# Patient Record
Sex: MALE | Race: BLACK OR AFRICAN AMERICAN | NOT HISPANIC OR LATINO | Employment: FULL TIME | URBAN - METROPOLITAN AREA
[De-identification: names, ages, dates, MRNs, and addresses within clinical notes are randomized per-mention and may not be internally consistent; named-entity substitution may affect disease eponyms.]

---

## 2023-10-15 ENCOUNTER — HOSPITAL ENCOUNTER (EMERGENCY)
Facility: HOSPITAL | Age: 43
Discharge: HOME | End: 2023-10-15
Attending: EMERGENCY MEDICINE
Payer: COMMERCIAL

## 2023-10-15 VITALS
RESPIRATION RATE: 13 BRPM | TEMPERATURE: 99 F | HEART RATE: 70 BPM | SYSTOLIC BLOOD PRESSURE: 120 MMHG | OXYGEN SATURATION: 99 % | DIASTOLIC BLOOD PRESSURE: 70 MMHG | WEIGHT: 147 LBS

## 2023-10-15 DIAGNOSIS — E87.6 HYPOKALEMIA: ICD-10-CM

## 2023-10-15 DIAGNOSIS — R09.02 HYPOXIA: ICD-10-CM

## 2023-10-15 DIAGNOSIS — T50.901A OVERDOSE: ICD-10-CM

## 2023-10-15 DIAGNOSIS — T50.901A ACCIDENTAL DRUG OVERDOSE, INITIAL ENCOUNTER: Primary | ICD-10-CM

## 2023-10-15 LAB
ALBUMIN SERPL-MCNC: 3.9 G/DL (ref 3.3–5.5)
ALLENS TEST: ABNORMAL
ALP SERPL-CCNC: 74 U/L (ref 42–141)
AMPHET+METHAMPHET UR QL: NEGATIVE
BARBITURATES UR QL SCN>200 NG/ML: NEGATIVE
BENZODIAZ UR QL SCN>200 NG/ML: NEGATIVE
BILIRUB SERPL-MCNC: 0.7 MG/DL (ref 0.2–1.6)
BILIRUBIN, POC UA: NEGATIVE
BLOOD, POC UA: NEGATIVE
BUN SERPL-MCNC: 13 MG/DL (ref 7–22)
BZE UR QL SCN: NEGATIVE
CALCIUM SERPL-MCNC: 9.5 MG/DL (ref 8–10.3)
CANNABINOIDS UR QL SCN: ABNORMAL
CHLORIDE SERPL-SCNC: 108 MMOL/L (ref 98–108)
CLARITY, POC UA: CLEAR
COLOR, POC UA: YELLOW
CREAT SERPL-MCNC: 0.9 MG/DL (ref 0.6–1.2)
CREAT UR-MCNC: 25.3 MG/DL (ref 23–375)
DELSYS: ABNORMAL
GLUCOSE SERPL-MCNC: 135 MG/DL (ref 73–118)
GLUCOSE, POC UA: NEGATIVE
HCO3 UR-SCNC: 17.9 MMOL/L (ref 24–28)
KETONES, POC UA: NEGATIVE
LDH SERPL L TO P-CCNC: 3.7 MMOL/L (ref 0.36–1.25)
LEUKOCYTE EST, POC UA: NEGATIVE
METHADONE UR QL SCN>300 NG/ML: NEGATIVE
NITRITE, POC UA: NEGATIVE
OPIATES UR QL SCN: NEGATIVE
PCO2 BLDA: 35.3 MMHG (ref 35–45)
PCP UR QL SCN>25 NG/ML: NEGATIVE
PH SMN: 7.31 [PH] (ref 7.35–7.45)
PH UR STRIP: 5.5 [PH]
PO2 BLDA: 140 MMHG (ref 80–100)
POC ALT (SGPT): 29 U/L (ref 10–47)
POC AST (SGOT): 33 U/L (ref 11–38)
POC B-TYPE NATRIURETIC PEPTIDE: 8.9 PG/ML (ref 0–100)
POC BE: -8 MMOL/L
POC CARDIAC TROPONIN I: 0 NG/ML (ref 0–0.08)
POC PTINR: 1.2 (ref 0.9–1.2)
POC PTWBT: 14.1 SEC (ref 9.7–14.3)
POC SATURATED O2: 99 % (ref 95–100)
POC TCO2: 19 MMOL/L (ref 18–33)
POC TCO2: 19 MMOL/L (ref 23–27)
POTASSIUM BLD-SCNC: 3.2 MMOL/L (ref 3.6–5.1)
PROTEIN, POC UA: NEGATIVE
PROTEIN, POC: 7.5 G/DL (ref 6.4–8.1)
SAMPLE: ABNORMAL
SAMPLE: NORMAL
SAMPLE: NORMAL
SITE: ABNORMAL
SODIUM BLD-SCNC: 141 MMOL/L (ref 128–145)
SPECIFIC GRAVITY, POC UA: <=1.005
TOXICOLOGY INFORMATION: ABNORMAL
UROBILINOGEN, POC UA: 0.2 E.U./DL

## 2023-10-15 PROCEDURE — 93005 ELECTROCARDIOGRAM TRACING: CPT | Mod: ER

## 2023-10-15 PROCEDURE — 63600175 PHARM REV CODE 636 W HCPCS: Mod: ER

## 2023-10-15 PROCEDURE — 93010 EKG 12-LEAD: ICD-10-PCS | Mod: ,,, | Performed by: INTERNAL MEDICINE

## 2023-10-15 PROCEDURE — 99285 EMERGENCY DEPT VISIT HI MDM: CPT | Mod: 25,ER

## 2023-10-15 PROCEDURE — 96360 HYDRATION IV INFUSION INIT: CPT | Mod: ER

## 2023-10-15 PROCEDURE — 80053 COMPREHEN METABOLIC PANEL: CPT | Mod: ER

## 2023-10-15 PROCEDURE — 80307 DRUG TEST PRSMV CHEM ANLYZR: CPT | Performed by: EMERGENCY MEDICINE

## 2023-10-15 PROCEDURE — 84484 ASSAY OF TROPONIN QUANT: CPT | Mod: ER

## 2023-10-15 PROCEDURE — 85025 COMPLETE CBC W/AUTO DIFF WBC: CPT | Mod: ER

## 2023-10-15 PROCEDURE — 25000003 PHARM REV CODE 250: Mod: ER | Performed by: EMERGENCY MEDICINE

## 2023-10-15 PROCEDURE — 93010 ELECTROCARDIOGRAM REPORT: CPT | Mod: ,,, | Performed by: INTERNAL MEDICINE

## 2023-10-15 PROCEDURE — 63600175 PHARM REV CODE 636 W HCPCS: Mod: ER | Performed by: EMERGENCY MEDICINE

## 2023-10-15 PROCEDURE — 82803 BLOOD GASES ANY COMBINATION: CPT | Mod: ER

## 2023-10-15 PROCEDURE — 96361 HYDRATE IV INFUSION ADD-ON: CPT | Mod: ER

## 2023-10-15 RX ORDER — NALOXONE HCL 0.4 MG/ML
1 VIAL (ML) INJECTION
Status: COMPLETED | OUTPATIENT
Start: 2023-10-15 | End: 2023-10-15

## 2023-10-15 RX ORDER — ONDANSETRON 8 MG/1
8 TABLET, ORALLY DISINTEGRATING ORAL EVERY 6 HOURS PRN
Qty: 12 TABLET | Refills: 0 | Status: SHIPPED | OUTPATIENT
Start: 2023-10-15 | End: 2023-10-18

## 2023-10-15 RX ORDER — NALOXONE HCL 0.4 MG/ML
VIAL (ML) INJECTION
Status: COMPLETED
Start: 2023-10-15 | End: 2023-10-15

## 2023-10-15 RX ORDER — ACETAMINOPHEN 500 MG
500 TABLET ORAL EVERY 6 HOURS PRN
Qty: 30 TABLET | Refills: 0 | Status: SHIPPED | OUTPATIENT
Start: 2023-10-15

## 2023-10-15 RX ORDER — FAMOTIDINE 20 MG/1
20 TABLET, FILM COATED ORAL 2 TIMES DAILY
Qty: 20 TABLET | Refills: 0 | Status: SHIPPED | OUTPATIENT
Start: 2023-10-15 | End: 2024-10-14

## 2023-10-15 RX ORDER — NALOXONE HYDROCHLORIDE 4 MG/.1ML
SPRAY NASAL
Qty: 1 EACH | Refills: 11 | Status: SHIPPED | OUTPATIENT
Start: 2023-10-15

## 2023-10-15 RX ADMIN — SODIUM CHLORIDE 1000 ML: 9 INJECTION, SOLUTION INTRAVENOUS at 11:10

## 2023-10-15 RX ADMIN — NALXONE HYDROCHLORIDE 0.4 MG: 0.4 INJECTION INTRAMUSCULAR; INTRAVENOUS; SUBCUTANEOUS at 10:10

## 2023-10-15 RX ADMIN — NALXONE HYDROCHLORIDE 1 MG: 0.4 INJECTION INTRAMUSCULAR; INTRAVENOUS; SUBCUTANEOUS at 09:10

## 2023-10-15 RX ADMIN — SODIUM CHLORIDE 1000 ML: 9 INJECTION, SOLUTION INTRAVENOUS at 10:10

## 2023-10-15 RX ADMIN — POTASSIUM BICARBONATE 25 MEQ: 977.5 TABLET, EFFERVESCENT ORAL at 11:10

## 2023-10-15 NOTE — ED TRIAGE NOTES
"Antoni Guzman, a 43 y.o. male presents to the ED w/ complaint of SOB after taking percs and ecstasy pills. Pt unable to answer questions and not responding to hurtado rub Charge and MD alerted IN Narcan given       Triage note:  Chief Complaint   Patient presents with    Drug Overdose     Reports taking "pain pill" 1hour ago with alcohol and reports "feeling Im going to die".  Lethargic at triage     Review of patient's allergies indicates:  No Known Allergies  No past medical history on file.   "

## 2023-10-15 NOTE — Clinical Note
"Antoni"Carito Guzman was seen and treated in our emergency department on 10/15/2023.  He may return to work on 10/17/2023.       If you have any questions or concerns, please don't hesitate to call.      Francine New, DO"

## 2023-10-15 NOTE — ED PROVIDER NOTES
"Encounter Date: 10/15/2023    SCRIBE #1 NOTE: I, Laverne Powell, am scribing for, and in the presence of,  Francine New DO. I have scribed the following portions of the note - Other sections scribed: HPI, ROS, PE.   SCRIBE #2 NOTE: I, Anamaria Damian, am scribing for, and in the presence of,  Francine New DO. I have scribed the following portions of the note - Other sections scribed: MDM.     History     Chief Complaint   Patient presents with    Drug Overdose     Reports taking "pain pill" 1hour ago with alcohol and reports "feeling Im going to die".  Lethargic at triage     Antoni Guzman is a 43 y.o. male who presents to the ED for chief complaint of SOB that began around 12 AM this morning. Patient states "feeling like I'm going to die". Pt reports taking one of his family member's pain pills for tooth pain. Patient reports he does not normally take pain medication. As per the independent historian, wife, patient also took ecstasy and drank 1-2 beers last night. Wife states patient crushes up ecstasy pills and snorts them. Patient woke up wife in this morning who rushed him to the hospital. Patient has no known allergies. Information provided by the Patient is limited due to AMS.       The history is provided by the patient and the spouse. No  was used.     Review of patient's allergies indicates:  No Known Allergies  No past medical history on file.  No past surgical history on file.  No family history on file.     Review of Systems   Unable to perform ROS: Mental status change   Constitutional:  Negative for fever.   HENT:  Negative for rhinorrhea and sore throat.    Respiratory:  Positive for shortness of breath.    Cardiovascular:  Negative for leg swelling.   Skin:  Negative for rash.   Neurological:  Negative for numbness.   All other systems reviewed and are negative.      Physical Exam     Initial Vitals   BP Pulse Resp Temp SpO2   10/15/23 0936 10/15/23 0936 10/15/23 0936 10/15/23 " 1021 10/15/23 0936   (!) 94/57 61 12 99 °F (37.2 °C) (!) 94 %      MAP       --                Physical Exam    Nursing note and vitals reviewed.  Constitutional: He appears well-developed and well-nourished. He appears toxic.   Patient's wife gave consent to have physical exam performed.  Woke to sternal rub initially, after IN Narcan woke to voice, and after IV Narcan pt is agitated.    HENT:   Head: Normocephalic and atraumatic.   Right Ear: External ear normal.   Left Ear: External ear normal.   Mouth/Throat: Oropharynx is clear and moist.   Eyes: Conjunctivae and EOM are normal. Pupils are equal, round, and reactive to light.   Neck: Neck supple.   Normal range of motion.  Cardiovascular:  Regular rhythm, normal heart sounds and intact distal pulses.   Tachycardia present.   Exam reveals no gallop and no friction rub.       No murmur heard.  Pulmonary/Chest: Breath sounds normal. No respiratory distress. He has no wheezes. He has no rhonchi. He has no rales.   Diminished breath sounds   Abdominal: Abdomen is soft. Bowel sounds are normal. He exhibits no distension. There is no abdominal tenderness. There is no rebound and no guarding.   Musculoskeletal:         General: No tenderness or edema. Normal range of motion.      Cervical back: Normal range of motion and neck supple.      Comments: Moving all four extremities, no obvious deformities, no bony tenderness to shoulder, elbows, wrists, hips or ankles.      Neurological: He is alert and oriented to person, place, and time.   Skin: Skin is warm and dry.   Psychiatric: He has a normal mood and affect. His behavior is normal.         ED Course   Critical Care    Date/Time: 10/15/2023 10:57 AM    Performed by: Francine New DO  Authorized by: Francine New DO  Direct patient critical care time: 15 minutes  Additional history critical care time: 15 minutes  Ordering / reviewing critical care time: 15 minutes  Documentation critical care time: 15  minutes  Consulting other physicians critical care time: 15 minutes  Consult with family critical care time: 15 minutes  Total critical care time (exclusive of procedural time) : 90 minutes  Critical care was necessary to treat or prevent imminent or life-threatening deterioration of the following conditions: respiratory failure and circulatory failure.  Critical care was time spent personally by me on the following activities: evaluation of patient's response to treatment, examination of patient, obtaining history from patient or surrogate, ordering and performing treatments and interventions, ordering and review of laboratory studies, ordering and review of radiographic studies, pulse oximetry, re-evaluation of patient's condition and review of old charts.        Labs Reviewed   DRUG SCREEN PANEL, URINE EMERGENCY - Abnormal; Notable for the following components:       Result Value    THC Presumptive Positive (*)     All other components within normal limits    Narrative:     Specimen Source->Urine   POCT URINALYSIS W/O SCOPE - Abnormal; Notable for the following components:    Spec Grav UA <=1.005 (*)     All other components within normal limits   ISTAT PROCEDURE - Abnormal; Notable for the following components:    POC PH 7.313 (*)     POC PO2 140 (*)     POC HCO3 17.9 (*)     POC Lactate 3.70 (*)     POC TCO2 19 (*)     All other components within normal limits   POCT CMP - Abnormal; Notable for the following components:    POC Glucose 135 (*)     POC Potassium 3.2 (*)     All other components within normal limits   TROPONIN ISTAT   POCT URINALYSIS W/O SCOPE   POCT CBC   POCT CMP   POCT PROTIME-INR   POCT TROPONIN   POCT B-TYPE NATRIURETIC PEPTIDE (BNP)   POCT B-TYPE NATRIURETIC PEPTIDE (BNP)   ISTAT PROCEDURE          Imaging Results              X-Ray Chest AP Portable (Final result)  Result time 10/15/23 10:53:51      Final result by Michael Juárez MD (10/15/23 10:53:51)                   Impression:     "  No acute chest disease identified.      Electronically signed by: Michael Juárez MD  Date:    10/15/2023  Time:    10:53               Narrative:    EXAMINATION:  XR CHEST AP PORTABLE    CLINICAL HISTORY:  Hypoxia;    TECHNIQUE:  Single frontal view of the chest was performed.    COMPARISON:  None    FINDINGS:  The heart and mediastinal structures are unremarkable.  Pulmonary vasculature is within normal limits.  The lungs are well aerated and free of focal consolidations.  There is no evidence for pneumothorax or large pleural effusions.  Bony structures are grossly intact.                                       Medications   naloxone (NARCAN) 0.4 mg/mL injection (0.4 mg  Given 10/15/23 1020)   sodium chloride 0.9% bolus 1,000 mL 1,000 mL (0 mLs Intravenous Stopped 10/15/23 1155)   naloxone 0.4 mg/mL injection 1 mg (0.4 mg Nasal Given 10/15/23 1020)   sodium chloride 0.9% bolus 1,000 mL 1,000 mL (0 mLs Intravenous Stopped 10/15/23 1401)   potassium bicarbonate disintegrating tablet 25 mEq (25 mEq Oral Given 10/15/23 1159)     Medical Decision Making  Amount and/or Complexity of Data Reviewed  Independent Historian: spouse     Details: Wife  Labs: ordered.  Radiology: ordered.    Risk  OTC drugs.  Prescription drug management.    Medical Decision Making:    This is an evaluation of a 43 y.o. male that presents to the Emergency Department for   Chief Complaint   Patient presents with    Drug Overdose     Reports taking "pain pill" 1hour ago with alcohol and reports "feeling Im going to die".  Lethargic at triage     Independent historian: Wife     The patient is a non-toxic and well appearing patient. On physical exam, patient appears well hydrated with moist mucus membranes. No murmurs. Abdomen soft and non tender. Patient is tolerating PO without difficulty.  Vital Signs Are Reassuring.     Based on the patient's symptoms, I am considering and evaluating for the following differential diagnoses: overdose, " accidental overdose, alcohol intoxication, hypoxia, respiratory failure.    Consider hospitalization for:  Overdose    Patient is not agreeable to transfer and admission to any hospital.    ED Course:Treatment in the ED included Physical Exam and medications given in ED  Medications   naloxone (NARCAN) 0.4 mg/mL injection (0.4 mg  Given 10/15/23 1020)   sodium chloride 0.9% bolus 1,000 mL 1,000 mL (0 mLs Intravenous Stopped 10/15/23 1155)   naloxone 0.4 mg/mL injection 1 mg (0.4 mg Nasal Given 10/15/23 1020)   sodium chloride 0.9% bolus 1,000 mL 1,000 mL (0 mLs Intravenous Stopped 10/15/23 1401)   potassium bicarbonate disintegrating tablet 25 mEq (25 mEq Oral Given 10/15/23 1159)   .   Patient reports feeling better after treatment in the ER.  Patient reports he is feeling pretty.  Signs been stable.  O2 sats present not on oxygen.  Heart rate within normal limits.  Blood pressure within normal limits.  Patient now tolerating p.o. without difficulty.    External Data/Documents Reviewed:   Labs: ordered and reviewed. Decision-making details documented in ED Course.  Radiology: ordered as indicated and reviewed. Decision-making details documented in ED Course.   ECG/medicine tests: ordered and independent interpretation performed by Dr. Francine New DO. Decision-making details documented in ED Course.     Risk  Diagnosis or treatment significantly limited by the following social determinants of health: There is no height or weight on file to calculate BMI.     In shared decision making with the patient and his wife at bedside, we discussed treatment, prescriptions, labs, and imaging results.    Discharge home with   ED Prescriptions       Medication Sig Dispense Start Date End Date Auth. Provider    naloxone (NARCAN) 4 mg/actuation Spry 4mg by nasal route as needed for opioid overdose; may repeat every 2-3 minutes in alternating nostrils until medical help arrives. Call 911 1 each 10/15/2023 -- Francine New DO     famotidine (PEPCID) 20 MG tablet Take 1 tablet (20 mg total) by mouth 2 (two) times daily. 20 tablet 10/15/2023 10/14/2024 Francine New DO    ondansetron (ZOFRAN-ODT) 8 MG TbDL Take 1 tablet (8 mg total) by mouth every 6 (six) hours as needed (As needed for nausea and vomiting). 12 tablet 10/15/2023 10/18/2023 Francine New DO    acetaminophen (TYLENOL) 500 MG tablet Take 1 tablet (500 mg total) by mouth every 6 (six) hours as needed for Pain (As needed for mild-to-moderate pain). 30 tablet 10/15/2023 -- Francine New DO          Fill and take prescriptions as directed.  Return to the ED if symptoms worsen or do not resolve.   Answered questions and discussed discharge plan.    Patient feels better and is ready for discharge.  Follow up with PCP/specialist in 1 day     At time of discharge patient is awake alert oriented x4 speaking clearly in full sentences and moving all 4 extremities.     The following labs and imaging were reviewed:        Admission on 10/15/2023, Discharged on 10/15/2023   Component Date Value Ref Range Status    POC PH 10/15/2023 7.313 (L)  7.35 - 7.45 Final    POC PCO2 10/15/2023 35.3  35 - 45 mmHg Final    POC PO2 10/15/2023 140 (H)  80 - 100 mmHg Final    POC HCO3 10/15/2023 17.9 (L)  24 - 28 mmol/L Final    POC BE 10/15/2023 -8  -2 to 2 mmol/L Final    POC SATURATED O2 10/15/2023 99  95 - 100 % Final    POC Lactate 10/15/2023 3.70 (HH)  0.36 - 1.25 mmol/L Final    POC TCO2 10/15/2023 19 (L)  23 - 27 mmol/L Final    Sample 10/15/2023 ARTERIAL   Final    Site 10/15/2023 LR   Final    Allens Test 10/15/2023 Pass   Final    DelSys 10/15/2023 Nasal Can   Final    Benzodiazepines 10/15/2023 Negative  Negative Final    Methadone metabolites 10/15/2023 Negative  Negative Final    Cocaine (Metab.) 10/15/2023 Negative  Negative Final    Opiate Scrn, Ur 10/15/2023 Negative  Negative Final    Barbiturate Screen, Ur 10/15/2023 Negative  Negative Final    Amphetamine Screen, Ur 10/15/2023 Negative   Negative Final    THC 10/15/2023 Presumptive Positive (A)  Negative Final    Phencyclidine 10/15/2023 Negative  Negative Final    Creatinine, Urine 10/15/2023 25.3  23.0 - 375.0 mg/dL Final    Toxicology Information 10/15/2023 SEE COMMENT   Final    Comment: This screen includes the following classes of drugs at the listed   cut-off:    Benzodiazepines 200 ng/ml  Methadone 300 ng/ml  Cocaine metabolite 300 ng/ml  Opiates 300 ng/ml  Barbiturates 200 ng/ml  Amphetamines 1000 ng/ml  Marijuana metabs (THC) 50 ng/ml  Phencyclidine (PCP) 25 ng/ml    This is a screening test. If results do not correlate with clinical   presentation, then a confirmatory send out test is advised.     This report is intended for use in clinical monitoring and management   of   patients. It is not intended for use in employment related drug   testing.      Albumin, POC 10/15/2023 3.9  3.3 - 5.5 g/dL Final    Alkaline Phosphatase, POC 10/15/2023 74  42 - 141 U/L Final    ALT (SGPT), POC 10/15/2023 29  10 - 47 U/L Final    AST (SGOT), POC 10/15/2023 33  11 - 38 U/L Final    POC BUN 10/15/2023 13  7 - 22 mg/dL Final    Calcium, POC 10/15/2023 9.5  8.0 - 10.3 mg/dL Final    POC Chloride 10/15/2023 108  98 - 108 mmol/L Final    POC Creatinine 10/15/2023 0.9  0.6 - 1.2 mg/dL Final    POC Glucose 10/15/2023 135 (H)  73 - 118 mg/dL Final    POC Potassium 10/15/2023 3.2 (L)  3.6 - 5.1 mmol/L Final    POC Sodium 10/15/2023 141  128 - 145 mmol/L Final    Bilirubin, POC 10/15/2023 0.7  0.2 - 1.6 mg/dL Final    POC TCO2 10/15/2023 19  18 - 33 mmol/L Final    Protein, POC 10/15/2023 7.5  6.4 - 8.1 g/dL Final    POC B-Type Natriuretic Peptide 10/15/2023 8.9  0.0 - 100.0 pg/mL Final    POC PTWBT 10/15/2023 14.1  9.7 - 14.3 sec Final    POC PTINR 10/15/2023 1.2  0.9 - 1.2 Final    Sample 10/15/2023 unknown   Final    POC Cardiac Troponin I 10/15/2023 0.00  0.00 - 0.08 ng/mL Final    Sample 10/15/2023 unknown   Final    Comment: A single negative troponin is  insufficient to rule out myocardial infarction.  The use of a serial sampling protocol is recommended practice. Correlate results with reference intervals established for methodology used. Point of care and core laboratory   troponin results are not interchangeable.      Glucose, UA 10/15/2023 Negative   Final    Bilirubin, UA 10/15/2023 Negative   Final    Ketones, UA 10/15/2023 Negative   Final    Spec Grav UA 10/15/2023 <=1.005 (<)   Final    Blood, UA 10/15/2023 Negative   Final    PH, UA 10/15/2023 5.5   Final    Protein, UA 10/15/2023 Negative   Final    Urobilinogen, UA 10/15/2023 0.2  E.U./dL Final    Nitrite, UA 10/15/2023 Negative   Final    Leukocytes, UA 10/15/2023 Negative   Final    Color, UA 10/15/2023 Yellow   Final    Clarity, UA 10/15/2023 Clear   Final        Imaging Results              X-Ray Chest AP Portable (Final result)  Result time 10/15/23 10:53:51      Final result by Michael Juárez MD (10/15/23 10:53:51)                   Impression:      No acute chest disease identified.      Electronically signed by: Michael Juárez MD  Date:    10/15/2023  Time:    10:53               Narrative:    EXAMINATION:  XR CHEST AP PORTABLE    CLINICAL HISTORY:  Hypoxia;    TECHNIQUE:  Single frontal view of the chest was performed.    COMPARISON:  None    FINDINGS:  The heart and mediastinal structures are unremarkable.  Pulmonary vasculature is within normal limits.  The lungs are well aerated and free of focal consolidations.  There is no evidence for pneumothorax or large pleural effusions.  Bony structures are grossly intact.                                           Scribe Attestation:   Scribe #1: I performed the above scribed service and the documentation accurately describes the services I performed. I attest to the accuracy of the note.  Scribe #2: I performed the above scribed service and the documentation accurately describes the services I performed. I attest to the accuracy of the note.         ED Course as of 10/15/23 1719   Sun Oct 15, 2023   1015 EKG interpreted by Dr. New.  No STEMI.  Normal sinus rhythm, ventricular rate of 71.  Normal axis.  Abnormal EKG, .  No prior EKG for comparison  [RF]   1300 Patient is resting comfortably has removed his oxygen.  Patient with O2 sat of 100%.  Vital signs reassuring. [RF]      ED Course User Index  [RF] Francine New DO                   I, Dr. Francine New, personally performed the services described in this documentation. This document was produced by a scribe under my direction and in my presence. All medical record entries made by the scribe were at my direction and in my presence.  I have reviewed the chart and agree that the record reflects my personal performance and is accurate and complete. Francine New DO.     10/15/2023 1:44 PM    Clinical Impression:   Final diagnoses:  [T50.901A] Overdose  [R09.02] Hypoxia  [T50.901A] Accidental drug overdose, initial encounter (Primary)  [E87.6] Hypokalemia        ED Disposition Condition    Discharge Stable          ED Prescriptions       Medication Sig Dispense Start Date End Date Auth. Provider    naloxone (NARCAN) 4 mg/actuation Spry 4mg by nasal route as needed for opioid overdose; may repeat every 2-3 minutes in alternating nostrils until medical help arrives. Call 911 1 each 10/15/2023 -- Francine New DO    famotidine (PEPCID) 20 MG tablet Take 1 tablet (20 mg total) by mouth 2 (two) times daily. 20 tablet 10/15/2023 10/14/2024 Francine New DO    ondansetron (ZOFRAN-ODT) 8 MG TbDL Take 1 tablet (8 mg total) by mouth every 6 (six) hours as needed (As needed for nausea and vomiting). 12 tablet 10/15/2023 10/18/2023 Francine New DO    acetaminophen (TYLENOL) 500 MG tablet Take 1 tablet (500 mg total) by mouth every 6 (six) hours as needed for Pain (As needed for mild-to-moderate pain). 30 tablet 10/15/2023 -- Francine New DO          Follow-up Information       Follow up With  Specialties Details Why Contact Info    [facility] -  Schedule an appointment as soon as possible for a visit in 1 day Please establish a primary care physician [address] [place] [address]  [phone]      [facility] Emergency Dept Emergency Medicine Go to  Please go to [facility] emergency department if symptoms worsen [address] [place] [address]  [phone]             Francine New DO  10/15/23 5430

## 2025-05-08 ENCOUNTER — HOSPITAL ENCOUNTER (EMERGENCY)
Facility: HOSPITAL | Age: 45
Discharge: HOME | End: 2025-05-08
Attending: EMERGENCY MEDICINE
Payer: COMMERCIAL

## 2025-05-08 VITALS
SYSTOLIC BLOOD PRESSURE: 167 MMHG | HEIGHT: 70 IN | HEART RATE: 88 BPM | TEMPERATURE: 98 F | RESPIRATION RATE: 18 BRPM | DIASTOLIC BLOOD PRESSURE: 98 MMHG | WEIGHT: 156 LBS | OXYGEN SATURATION: 99 % | BODY MASS INDEX: 22.33 KG/M2

## 2025-05-08 DIAGNOSIS — K08.89 PAIN, DENTAL: Primary | ICD-10-CM

## 2025-05-08 DIAGNOSIS — K04.7 DENTAL ABSCESS: ICD-10-CM

## 2025-05-08 PROCEDURE — 25000003 PHARM REV CODE 250: Mod: ER

## 2025-05-08 PROCEDURE — 99283 EMERGENCY DEPT VISIT LOW MDM: CPT | Mod: ER

## 2025-05-08 RX ORDER — AMOXICILLIN AND CLAVULANATE POTASSIUM 875; 125 MG/1; MG/1
1 TABLET, FILM COATED ORAL 2 TIMES DAILY
Qty: 14 TABLET | Refills: 0 | Status: SHIPPED | OUTPATIENT
Start: 2025-05-08

## 2025-05-08 RX ORDER — ACETAMINOPHEN 500 MG
1000 TABLET ORAL
Status: COMPLETED | OUTPATIENT
Start: 2025-05-08 | End: 2025-05-08

## 2025-05-08 RX ORDER — AMOXICILLIN AND CLAVULANATE POTASSIUM 875; 125 MG/1; MG/1
1 TABLET, FILM COATED ORAL
Status: COMPLETED | OUTPATIENT
Start: 2025-05-08 | End: 2025-05-08

## 2025-05-08 RX ORDER — IBUPROFEN 600 MG/1
600 TABLET, FILM COATED ORAL EVERY 6 HOURS PRN
Qty: 20 TABLET | Refills: 0 | Status: SHIPPED | OUTPATIENT
Start: 2025-05-08

## 2025-05-08 RX ADMIN — ACETAMINOPHEN 1000 MG: 500 TABLET ORAL at 03:05

## 2025-05-08 RX ADMIN — AMOXICILLIN AND CLAVULANATE POTASSIUM 1 TABLET: 875; 125 TABLET, FILM COATED ORAL at 03:05

## 2025-05-08 NOTE — DISCHARGE INSTRUCTIONS
Problem Specific Instructions: This emergency department does not have the resources available to definitively treat your dental problem. For this, you need to see a dentist. You may have been offered pain medicine, an injection, topical medicine, or antibiotics and need to take those medicines as instructed.    DENTAL RESOURCES      [facility]       [phone]     [facility] 8-4 Monday - Friday       [phone]     [facility]romised [facility]       [phone]     [facility]                                 0-6 years                                                                                             7-13 years     [phone]     [facility]    [facility]   Developmental Disability Care     [phone]     [facility]       [phone]     [facility]    [address], Mon-Fri not on Wed  8am-4pm    Over 60 years old living in [place]           [phone]     [facility]    [address] [place]     [phone]     [facility] [place]       [phone]     [facility]  [address]       [phone]     [facility]       [phone]        If you received or are discharged with pain medicine or muscle relaxers, understand that they can make you sleepy or impair your judgement. Do not make important decisions, drink, drive, swim or perform any other tasks you would not otherwise perform while impaired for at least 24 hours after your last dose.      Ensure you follow up with your Primary Care Provider or any additional providers listed on this discharge sheet. While you may be healthy enough to go home today, I cannot predict the exact course of your diagnoses. It is important to remember that some  problems are difficult to diagnose and may not be found during your first visit. As such, it is your responsibility to monitor symptoms, follow-up with another healthcare provider, or return to the emergency room for new or worsening concerns. Unless otherwise instructed, continue all home medications and any new medications prescribed to you in the Emergency Department.     General Maintenance for otherwise healthy adults: Ensure adequate hydration to prevent prolonged illness and recovery. This should include about 14-16 cups of water daily.  Monitor your caloric intake with a goal of obtaining and maintaining a healthy weight and BMI to help prevent the development of chronic and life-threatening medical conditions. Talk with your primary care provider about how to start healthy fitness habits and aim for a goal of 30 minutes to an hour of exercise 3-5 times a week. Avoid the use of tobacco, alcohol, and illicit drugs as these may be detrimental to your health goals.

## 2025-05-08 NOTE — ED PROVIDER NOTES
Encounter Date: 5/8/2025    SCRIBE #1 NOTE: I, Nicole Muniz, am scribing for, and in the presence of,  Marilu Carrera PA-C. I have scribed the following portions of the note - Other sections scribed: HPI,ROS.       History     Chief Complaint   Patient presents with    Dental Pain     Left lower jaw pain x 3 days; dental caries reported in same area; denies fever     Antoni Guzman is a 44 y.o. male, with no pertinent PMHx, who presents to the ED with dental pain onset 3 days ago. Patient reports dental pain to his left lower tooth/jaw, he denies seeing a dentist for his pain. Patient endorses taking ibuprofen 3 hours ago with no alleviation. No other exacerbating or alleviating factors. Denies neck pain, trouble swallowing, fever, discharge from tooth, difficulty opening jaw, obvious abscess or other associated symptoms.      The history is provided by the patient. No  was used.     Review of patient's allergies indicates:  No Known Allergies  History reviewed. No pertinent past medical history.  Past Surgical History:   Procedure Laterality Date    REPAIR, RUPTURE, TENDON, ACHILLES Right      No family history on file.  Social History[1]  Review of Systems   Constitutional:  Negative for fever.   HENT:  Positive for dental problem. Negative for ear pain, sore throat and trouble swallowing.         (-)Dental discharge  (-)Difficulty opening jaw   Eyes:  Negative for visual disturbance.   Respiratory:  Negative for shortness of breath.    Cardiovascular:  Negative for chest pain.   Gastrointestinal:  Negative for diarrhea and vomiting.   Genitourinary:  Negative for dysuria.   Musculoskeletal:  Negative for neck pain.   Skin:  Negative for rash.   Neurological:  Negative for syncope.       Physical Exam     Initial Vitals [05/08/25 1455]   BP Pulse Resp Temp SpO2   (!) 167/98 88 18 98 °F (36.7 °C) 99 %      MAP       --         Physical Exam    Vitals reviewed.  Constitutional: He appears  well-developed and well-nourished. No distress.   HENT:   Head: Normocephalic.   Right Ear: Tympanic membrane and external ear normal.   Left Ear: Tympanic membrane and external ear normal.   Nose: Nose normal. Mouth/Throat: Uvula is midline and oropharynx is clear and moist. No trismus in the jaw. Abnormal dentition. Dental caries present. No dental abscesses.       Multiple dental caries and cracked teeth.  Patient reporting pain to tooth 17 which is cracked.  There is surrounding gingival erythema with mild tenderness to palpation.  No obvious sign of abscess that is drainable at this time.  No facial swelling appreciated.  Patient maintaining oral secretions.   Eyes: Conjunctivae are normal.   Neck: Neck supple.   Normal range of motion.  Pulmonary/Chest: No respiratory distress.   Musculoskeletal:         General: Normal range of motion.      Cervical back: Normal range of motion and neck supple. No rigidity.     Neurological: He is alert.   Skin: Skin is warm and dry. No rash noted.   Psychiatric: He has a normal mood and affect. His behavior is normal. Judgment and thought content normal.         ED Course   Procedures  Labs Reviewed - No data to display       Imaging Results    None          Medications   amoxicillin-clavulanate 875-125mg per tablet 1 tablet (1 tablet Oral Given 5/8/25 1537)   acetaminophen tablet 1,000 mg (1,000 mg Oral Given 5/8/25 1537)     Medical Decision Making  This is an evaluation of a 44 y.o. male that presents to the Emergency Department for dental pain. The patient reports no fever, chills, nausea, vomiting, or inability to open mouth. Physical Exam shows a non-toxic, afebrile, and well appearing male with pain to tooth 17 were a cracked tooth is present. There is no facial swelling. There is no visible oral drainable abscess at this time. She has no facial cellulitis, oral swelling, airway compromise, sublingual swelling/elevation, or trismus. Neck spaces are soft. There is  gingival erythema on the facial and lingual surfaces surrounding the tooth. Vital signs are reassuring.     My overall impression is dental pain/infection. I considered, but at this time, do not suspect Spencer's angina, deep space infection, peritonsillar infection, pharyngitis, mastoiditis, or a facial cellulitis.    ED Course:  Patient given lollicaine here in ED along with Tylenol and 1st dose of antibiotic. D/C Meds:  Sent home with prescription of Augmentin. Additional D/C Information:  Sent home patient with list of dentist for him to reach out to and schedule an appointment.  Instructed to take Tylenol/ibuprofen as needed for the pain.  Spoke with patient about applying warm compresses to the area to help with pain relief.  The diagnosis, treatment plan, instructions for follow-up and reevaluation with a dentist as well as ED return precautions were discussed and understanding was verbalized. All questions or concerns have been addressed.       Risk  OTC drugs.  Prescription drug management.            Scribe Attestation:   Scribe #1: I performed the above scribed service and the documentation accurately describes the services I performed. I attest to the accuracy of the note.              I, Marilu Carrera PA-C, personally performed the services described in this documentation. All medical record entries made by the scribe were at my direction and in my presence. I have reviewed the chart and agree that the record reflects my personal performance and is accurate and complete.                  Clinical Impression:  Final diagnoses:  [K08.89] Pain, dental (Primary)  [K04.7] Dental abscess          ED Disposition Condition    Discharge Stable          ED Prescriptions       Medication Sig Dispense Start Date End Date Auth. Provider    amoxicillin-clavulanate 875-125mg (AUGMENTIN) 875-125 mg per tablet Take 1 tablet by mouth 2 (two) times daily. 14 tablet 5/8/2025 -- Marilu Carrera PA-C    ibuprofen  (ADVIL,MOTRIN) 600 MG tablet Take 1 tablet (600 mg total) by mouth every 6 (six) hours as needed for Pain. 20 tablet 5/8/2025 -- Marilu Carrera PA-C          Follow-up Information       Follow up With Specialties Details Why Contact [facility] ED Emergency Medicine Go to  If symptoms worsen, As needed, shortness of breath, chest pain, fever, worsening cough, nausea, vomiting, abdominal pain [id number] [facility] [id number] [phone]               [1]   Social History  Tobacco Use    Smoking status: Some Days     Types: Cigars    Smokeless tobacco: Never   Substance Use Topics    Alcohol use: Yes     Comment: occassional        Marilu Carrera PA-C  05/08/25 [id number]

## 2025-05-08 NOTE — Clinical Note
"Antoni"Carito Guzman was seen and treated in our emergency department on 5/8/2025.  He may return to work on 05/10/2025.       If you have any questions or concerns, please don't hesitate to call.      Marilu Carrera PA-C"